# Patient Record
Sex: MALE | Race: WHITE
[De-identification: names, ages, dates, MRNs, and addresses within clinical notes are randomized per-mention and may not be internally consistent; named-entity substitution may affect disease eponyms.]

---

## 2017-04-10 ENCOUNTER — HOSPITAL ENCOUNTER (OUTPATIENT)
Dept: HOSPITAL 80 - CIMAGING | Age: 42
End: 2017-04-10
Attending: FAMILY MEDICINE
Payer: COMMERCIAL

## 2017-04-10 DIAGNOSIS — M85.811: Primary | ICD-10-CM

## 2017-12-28 ENCOUNTER — HOSPITAL ENCOUNTER (EMERGENCY)
Dept: HOSPITAL 80 - CED | Age: 42
Discharge: HOME | End: 2017-12-28
Payer: COMMERCIAL

## 2017-12-28 VITALS
SYSTOLIC BLOOD PRESSURE: 123 MMHG | OXYGEN SATURATION: 96 % | TEMPERATURE: 99 F | HEART RATE: 99 BPM | RESPIRATION RATE: 18 BRPM | DIASTOLIC BLOOD PRESSURE: 85 MMHG

## 2017-12-28 DIAGNOSIS — M62.830: ICD-10-CM

## 2017-12-28 DIAGNOSIS — F17.200: ICD-10-CM

## 2017-12-28 DIAGNOSIS — J20.9: Primary | ICD-10-CM

## 2017-12-28 NOTE — EDPHY
H & P


Time Seen by Provider: 12/28/17 17:24


HPI/ROS: 





42-year-old male presents complaining of productive cough for several weeks.


He is a smoker.


He denies fevers or chills or body aches.


He does state that is chronic back pain has been exacerbated by coughing.








Review of systems


As per HPI


General no fever no chills no weakness


HEENT no eye pain no eye discharge. No eye redness, no sore throat


Respiratory positive cough, no shortness of breath


Cardiac no chest pain, no peripheral edema


GI no abdominal pain, no diarrhea, no constipation, no nausea, no vomiting


  no flank pain, no hematuria, no dysuria


Musculoskeletal no myalgias, no joint pain, positive back pain


Heme  no easy bruising, no easy bleeding


Endo no polyuria, no polydipsia


Skin no rashes, no pruritus


Neuro no syncope, no dizziness, no headaches


Psych is no suicidal ideation, no homicidal ideation





Past Medical/Surgical History: 





Chronic back pain


Social History: 





Alcohol socially, denies drug use


Smoking Status: Current some day smoker


Physical Exam: 


42-year-old male


Alert and oriented nontoxic appearance, no acute distress afebrile


Atraumatic normocephalic


Extraocular muscles intact, anicteric


Nares mild yellowish discharge


Oropharynx mild erythema no tonsillar swelling no exudate no uvular deviation, 

tolerating own secretions


Neck supple no lymphadenopathy


Lungs clear to auscultation bilaterally, diminished breath sounds left upper 

lobe


Heart regular rate and rhythm


Abdomen normoactive bowel sounds soft nontender


Back no step-offs, no CVA tenderness no ecchymosis no rash, no swelling


Extremities no cyanosis clubbing or edema


Skin no rash


Constitutional: 


 Initial Vital Signs











Temperature (C)  37.2 C   12/28/17 17:26


 


Heart Rate  99   12/28/17 17:26


 


Respiratory Rate  18   12/28/17 17:26


 


Blood Pressure  123/85 H  12/28/17 17:26


 


O2 Sat (%)  96   12/28/17 17:26








 











O2 Delivery Mode               Room Air














Allergies/Adverse Reactions: 


 





divalproex sodium [From Depakote] Allergy (Unknown, Verified 12/28/17 17:25)


 








Home Medications: 














 Medication  Instructions  Recorded


 


AZITHROMYCIN [Z-PACK] 250 mg PO DAILY #6 tab 12/28/17


 


Cyclobenzaprine [Flexeril 10 MG 10 mg PO TID PRN #15 tab 12/28/17





(*)]  


 


Latuda  12/28/17














Medical Decision Making





- Diagnostics


Imaging Results: 


 Imaging Impressions





Chest X-Ray  12/28/17 17:37


Impression: No pneumonia. Mild central bronchitis..











ED Course/Re-evaluation: 





Patient seen and evaluated for productive cough.





Chest x-ray


Negative for infiltrate








Impression


Bronchitis


Back spasm





Plan


Z-Cecilio


Cyclobenzaprine


Follow-up PCP


Differential Diagnosis: 





Differential diagnosis considered but not limited to:


URI, bronchitis, viral syndrome, influenza, pneumonia





Departure





- Departure


Disposition: Home, Routine, Self-Care


Clinical Impression: 


 Acute bronchitis, Back muscle spasm





Condition: Good


Instructions:  Acute Bronchitis (ED)


Additional Instructions: 


Follow up with your primary care in 5-7 days.


Referrals: 


Unknown,Unknown [Primary Care Provider] - As per Instructions


Prescriptions: 


AZITHROMYCIN [Z-PACK] 250 mg PO DAILY #6 tab


Cyclobenzaprine [Flexeril 10 MG (*)] 10 mg PO TID PRN #15 tab


 PRN Reason: Spasms

## 2019-01-14 ENCOUNTER — HOSPITAL ENCOUNTER (OUTPATIENT)
Dept: HOSPITAL 80 - CIMAGING | Age: 44
End: 2019-01-14
Attending: FAMILY MEDICINE
Payer: COMMERCIAL

## 2019-01-14 DIAGNOSIS — M25.551: Primary | ICD-10-CM
